# Patient Record
(demographics unavailable — no encounter records)

---

## 2025-07-09 NOTE — HISTORY OF PRESENT ILLNESS
[FreeTextEntry1] : Pt is a 51 y/o male with HTN, HLD, DM2.  DM diagnosis: age 20s. Last A1c: 12.0% Previous endocrinologist: none Home DM meds: Lantus 40 units qhs, glimepiride 4mg BID, metformin 1000mg BID, pioglitazone 45mg daily. Adherence: complete SMBG: Dexcom G7 report reviewed. Average glucose 206, GMI 8.2%, TIR 34%, 47% high, 19% very high, 0% low, 0% very low. Best BG control early morning. Symptoms: No N/V/D/C Diet at home: Eggs in the morning, one slice of bread and a banana; lunch is a ham/cheese/lettuce sandwich; tacos with 4-5 tortillas. Microvascular complications: No CKD, had elevated urine ACR x1 but may have had more not available today. UTD with ophtho, no retinopathy; no neuropathy Macrovascular complications: No CVA, MI, PAD ACEi/ARB: ramipril 5mg daily Statin: atorvastatin 20mg daily PMHx: As above. No hx of HF, pancreatitis, gallbladder disease, UTIs/yeast infections. No amputations or wounds. No DKA. FHx: Father and 4 brothers and one sister with DM. All siblings are skinny. No thyroid/bladder/pancreatic cancer. SHx: Smokes 10-11 cigarettes per day. Infrequent etoh use.  PCP - Dr. Tyler - [phone]

## 2025-07-09 NOTE — ASSESSMENT
[FreeTextEntry1] : Pt is a 51 y/o male with HTN, HLD, DM2.  RAFAEL THERESA Ab positive. C-peptide 2.7 with BG in the 200s. DM diagnosis: age 20s. Last A1c: 12.0% Previous endocrinologist: none Home DM meds: Lantus 40 units qhs, glimepiride 4mg BID, metformin 1000mg BID, pioglitazone 45mg daily. Adherence: complete SMBG: Dexcom G7 report reviewed. Average glucose 206, GMI 8.2%, TIR 34%, 47% high, 19% very high, 0% low, 0% very low. Best BG control early morning. -Continue Lantus 40 units qhs -Stop glimepiride 4mg BID -Continue metformin 1000mg BID and pioglitazone 45mg daily. -Start Mounjaro 2.5mg weekly x4 weeks then increase to 5 mg weekly if tolerating lower dose. Discussed risks/benefits/side effects/alternatives. No hx of pancreatitis, gallbladder disease, or personal or FHx of thyroid cancer. -Start Jardiance 10mg daily. Discussed risks/benefits/side effects/alternatives. Advised to hold 3 days before prolonged fast or surgery. Advised to watch for signs of ketoacidosis such as SOB, N/V. -Patient to check FSBG twice daily in staggered manner and bring logs to next visit -Obtain urine albumin-to-creatinine ratio annually to screen for moderately increased albuminuria -Screen for Vitamin B12 deficiency annually as on Metformin -UTD with ophthalmologist for dilated eye exam -Referral to nutritionist offered -Check RAFAEL Abs annually -Check c-peptide q6 months.   HTN -Goal BP <130/80. Continue ramipril 5mg daily.   HLD -Increase atorvastatin to 40mg daily.  RTC 2 months.   Timmy Akhtar DO  PCP - Dr. Tyler - [phone]